# Patient Record
Sex: MALE | Race: WHITE | NOT HISPANIC OR LATINO | Employment: OTHER | URBAN - NONMETROPOLITAN AREA
[De-identification: names, ages, dates, MRNs, and addresses within clinical notes are randomized per-mention and may not be internally consistent; named-entity substitution may affect disease eponyms.]

---

## 2023-11-05 PROBLEM — E66.3 OVERWEIGHT: Status: ACTIVE | Noted: 2023-11-05

## 2023-11-05 PROBLEM — I48.20 CHRONIC ATRIAL FIBRILLATION (MULTI): Status: ACTIVE | Noted: 2023-11-05

## 2023-11-05 PROBLEM — I71.40 AAA (ABDOMINAL AORTIC ANEURYSM) (CMS-HCC): Status: ACTIVE | Noted: 2023-11-05

## 2023-11-05 PROBLEM — Z79.01 ANTICOAGULANT LONG-TERM USE: Status: ACTIVE | Noted: 2023-11-05

## 2023-11-05 PROBLEM — E78.5 HYPERLIPIDEMIA: Status: ACTIVE | Noted: 2023-11-05

## 2023-11-05 RX ORDER — ATORVASTATIN CALCIUM 20 MG/1
TABLET, FILM COATED ORAL
COMMUNITY
Start: 2022-08-30 | End: 2023-11-07 | Stop reason: SDUPTHER

## 2023-11-05 RX ORDER — FINASTERIDE 5 MG/1
1 TABLET, FILM COATED ORAL DAILY
COMMUNITY

## 2023-11-05 RX ORDER — GLUCOSAM/CHONDRO/HERB 149/HYAL 750-100 MG
TABLET ORAL
COMMUNITY

## 2023-11-05 RX ORDER — MULTIVITAMIN
1 TABLET ORAL DAILY
COMMUNITY

## 2023-11-05 RX ORDER — CARVEDILOL 6.25 MG/1
1 TABLET ORAL 2 TIMES DAILY
COMMUNITY
Start: 2022-09-06 | End: 2023-11-07 | Stop reason: SDUPTHER

## 2023-11-05 RX ORDER — FENOFIBRATE 145 MG/1
TABLET, FILM COATED ORAL
COMMUNITY
Start: 2021-12-16 | End: 2023-11-07 | Stop reason: SDUPTHER

## 2023-11-05 RX ORDER — MULTIVIT-MIN/IRON/FOLIC ACID/K 18-600-40
CAPSULE ORAL
COMMUNITY

## 2023-11-07 ENCOUNTER — OFFICE VISIT (OUTPATIENT)
Dept: CARDIOLOGY | Facility: CLINIC | Age: 78
End: 2023-11-07
Payer: MEDICARE

## 2023-11-07 VITALS
HEART RATE: 60 BPM | BODY MASS INDEX: 27.77 KG/M2 | HEIGHT: 72 IN | SYSTOLIC BLOOD PRESSURE: 112 MMHG | WEIGHT: 205 LBS | DIASTOLIC BLOOD PRESSURE: 60 MMHG

## 2023-11-07 DIAGNOSIS — E78.5 HYPERLIPIDEMIA, UNSPECIFIED HYPERLIPIDEMIA TYPE: ICD-10-CM

## 2023-11-07 DIAGNOSIS — I71.40 ABDOMINAL AORTIC ANEURYSM (AAA), UNSPECIFIED PART, UNSPECIFIED WHETHER RUPTURED (CMS-HCC): ICD-10-CM

## 2023-11-07 DIAGNOSIS — I48.20 CHRONIC ATRIAL FIBRILLATION (MULTI): ICD-10-CM

## 2023-11-07 PROCEDURE — 99213 OFFICE O/P EST LOW 20 MIN: CPT | Performed by: INTERNAL MEDICINE

## 2023-11-07 PROCEDURE — 1159F MED LIST DOCD IN RCRD: CPT | Performed by: INTERNAL MEDICINE

## 2023-11-07 RX ORDER — ATORVASTATIN CALCIUM 20 MG/1
TABLET, FILM COATED ORAL
Qty: 90 TABLET | Refills: 3 | Status: SHIPPED | OUTPATIENT
Start: 2023-11-07

## 2023-11-07 RX ORDER — FENOFIBRATE 145 MG/1
TABLET, FILM COATED ORAL
Qty: 90 TABLET | Refills: 3 | Status: SHIPPED | OUTPATIENT
Start: 2023-11-07

## 2023-11-07 RX ORDER — CARVEDILOL 6.25 MG/1
6.25 TABLET ORAL 2 TIMES DAILY
Qty: 180 TABLET | Refills: 3 | Status: SHIPPED | OUTPATIENT
Start: 2023-11-07

## 2023-11-07 NOTE — PROGRESS NOTES
Subjective   Bolivar Oviedo is a 78 y.o. male            HPI     Patient returns in follow-up of problems as noted.  He is doing well.  He tolerates chronic anticoagulant therapy as treatment for his chronic atrial fibrillation he denies any symptoms of palpitation orthopnea PND or dyspnea exertion and he has no easy fatigability.  Overall he is tolerating his atrial fibrillation well.    Risk factor management is briefly discussed and is satisfactory.    He is concerned regarding abdominal aortic aneurysm.  I proposed testing next year because previous assessments of the aneurysm demonstrated a relatively small degree of aneurysmal dilatation.  He is happy to proceed in this fashion.  Review of Systems   All other systems reviewed and are negative.         Visit Vitals  /60 (BP Location: Right arm, Patient Position: Sitting)   Pulse 60   Ht 1.829 m (6')   Wt 93 kg (205 lb)   BMI 27.80 kg/m²   Smoking Status Former   BSA 2.17 m²        Objective   Physical Exam  Constitutional:       Appearance: Normal appearance. He is normal weight.   HENT:      Nose: Nose normal.   Neck:      Vascular: No carotid bruit.   Cardiovascular:      Rate and Rhythm: Normal rate.      Pulses: Normal pulses.      Heart sounds: Normal heart sounds.   Pulmonary:      Effort: Pulmonary effort is normal.   Abdominal:      General: Bowel sounds are normal.      Palpations: Abdomen is soft.   Genitourinary:     Rectum: Normal.   Musculoskeletal:         General: Normal range of motion.      Cervical back: Normal range of motion.      Right lower leg: No edema.      Left lower leg: No edema.   Skin:     General: Skin is warm and dry.   Neurological:      General: No focal deficit present.      Mental Status: He is alert.   Psychiatric:         Mood and Affect: Mood normal.         Behavior: Behavior normal.         Thought Content: Thought content normal.         Judgment: Judgment normal.         Current Medications    Current Outpatient  Medications:     apixaban (Eliquis) 5 mg tablet, Take 1 tablet (5 mg) by mouth 2 times a day., Disp: , Rfl:     ascorbic acid, vitamin C, 500 mg capsule, one tablet daily, Disp: , Rfl:     atorvastatin (Lipitor) 20 mg tablet, TAKE ONE-HALF (1/2) TABLET AT BEDTIME, Disp: , Rfl:     carvedilol (Coreg) 6.25 mg tablet, Take 1 tablet (6.25 mg) by mouth 2 times a day., Disp: , Rfl:     fenofibrate (Tricor) 145 mg tablet, TAKE 1 TABLET Daily with Atorvastatin, Disp: , Rfl:     finasteride (Proscar) 5 mg tablet, Take 1 tablet (5 mg) by mouth once daily., Disp: , Rfl:     multivitamin tablet, Take 1 tablet by mouth once daily., Disp: , Rfl:     omega 3-dha-epa-fish oil (Fish OiL) 1,000 mg (120 mg-180 mg) capsule, one tablet daily, Disp: , Rfl:                      Assessment/Plan   1. Abdominal aortic aneurysm (AAA), unspecified part, unspecified whether ruptured (CMS/HCC)  Previously not significantly enlarged.  Repeat ultrasound will be done in the spring    2. Chronic atrial fibrillation (CMS/HCC)  Chronic stable with good rate control no symptoms.  Tolerating rate control with anticoagulant therapy without complication or complaint.

## 2024-10-23 ENCOUNTER — APPOINTMENT (OUTPATIENT)
Dept: CARDIOLOGY | Facility: CLINIC | Age: 79
End: 2024-10-23
Payer: MEDICARE

## 2024-11-11 ENCOUNTER — APPOINTMENT (OUTPATIENT)
Dept: CARDIOLOGY | Facility: CLINIC | Age: 79
End: 2024-11-11
Payer: MEDICARE

## 2024-11-11 ENCOUNTER — OFFICE VISIT (OUTPATIENT)
Dept: CARDIOLOGY | Facility: CLINIC | Age: 79
End: 2024-11-11
Payer: MEDICARE

## 2024-11-11 VITALS
WEIGHT: 204 LBS | HEIGHT: 72 IN | BODY MASS INDEX: 27.63 KG/M2 | HEART RATE: 58 BPM | SYSTOLIC BLOOD PRESSURE: 105 MMHG | DIASTOLIC BLOOD PRESSURE: 67 MMHG

## 2024-11-11 DIAGNOSIS — I48.20 CHRONIC ATRIAL FIBRILLATION (MULTI): Primary | ICD-10-CM

## 2024-11-11 DIAGNOSIS — Z87.891 FORMER SMOKER: ICD-10-CM

## 2024-11-11 DIAGNOSIS — Z79.01 ANTICOAGULANT LONG-TERM USE: ICD-10-CM

## 2024-11-11 DIAGNOSIS — E78.2 MIXED HYPERLIPIDEMIA: ICD-10-CM

## 2024-11-11 DIAGNOSIS — I71.40 ABDOMINAL AORTIC ANEURYSM (AAA), UNSPECIFIED PART, UNSPECIFIED WHETHER RUPTURED (CMS-HCC): ICD-10-CM

## 2024-11-11 PROBLEM — E66.3 OVERWEIGHT: Status: RESOLVED | Noted: 2023-11-05 | Resolved: 2024-11-11

## 2024-11-11 PROCEDURE — 93000 ELECTROCARDIOGRAM COMPLETE: CPT | Performed by: INTERNAL MEDICINE

## 2024-11-11 PROCEDURE — 99213 OFFICE O/P EST LOW 20 MIN: CPT | Performed by: INTERNAL MEDICINE

## 2024-11-11 PROCEDURE — 1160F RVW MEDS BY RX/DR IN RCRD: CPT | Performed by: INTERNAL MEDICINE

## 2024-11-11 PROCEDURE — G2211 COMPLEX E/M VISIT ADD ON: HCPCS | Performed by: INTERNAL MEDICINE

## 2024-11-11 PROCEDURE — 1159F MED LIST DOCD IN RCRD: CPT | Performed by: INTERNAL MEDICINE

## 2024-11-11 RX ORDER — CARVEDILOL 6.25 MG/1
6.25 TABLET ORAL 2 TIMES DAILY
Qty: 180 TABLET | Refills: 3 | Status: SHIPPED | OUTPATIENT
Start: 2024-11-11

## 2024-11-11 RX ORDER — FENOFIBRATE 145 MG/1
TABLET, FILM COATED ORAL
Qty: 90 TABLET | Refills: 3 | Status: SHIPPED | OUTPATIENT
Start: 2024-11-11

## 2024-11-11 RX ORDER — ATORVASTATIN CALCIUM 20 MG/1
TABLET, FILM COATED ORAL
Qty: 45 TABLET | Refills: 3 | Status: SHIPPED | OUTPATIENT
Start: 2024-11-11

## 2024-11-11 NOTE — LETTER
November 11, 2024     David Ace MD  [address] [facility] [address]    Patient: Bolivar Oviedo   YOB: 1945   Date of Visit: 11/11/2024       Dear Dr. David Ace MD:    Thank you for referring Bolivar Oviedo to me for evaluation. Below are my notes for this consultation.  If you have questions, please do not hesitate to call me. I look forward to following your patient along with you.       Sincerely,     Eva Beth MD      CC: No Recipients  ______________________________________________________________________________________    Subjective   Bolivar Oviedo is a 79 y.o. male       Chief Complaint    Annual Exam          HPI   Patient is here for follow-up continue management for chronic atrial fibrillation.  He is a former patient of Dr. Leroy.  He does not have history of chronic atrial fibrillation treated with heart rate control and long-term anticoagulation.  He is a patient of the [facility].  Since last time he was seen here he denies any cardiac complaint of chest pain, palpitation, lightheadedness, dizziness or syncope.  He remains reasonably active.  His labs from last year noted and reviewed with him.    Assessment    1.  Chronic permanent atrial fibrillation heart rate controlled and on long-term anticoagulation  2.  Anticoagulated well-tolerated  3.  Mixed hyperlipidemia  4.  History of abdominal aortic aneurysm followed by his PCP  5.  Mildly overweight with BMI of 27    Plan    1.  I reviewed with the patient his present medical regimen and I suggested to continue same treatment  2.  I advised him to forward copy of his lab work when it is done to me  3.  I renewed his medication  4.  I will see him back in the office in 9 months and follow-up  Review of Systems   All other systems reviewed and are negative.           Vitals:    11/11/24 0942 11/11/24 1011   BP: 88/60 105/67   BP Location: Left arm Left arm   Patient Position: Sitting Sitting    Pulse: 58    Weight: 92.5 kg (204 lb)    Height: 1.829 m (6')           EKG done in office today    Objective   Physical Exam  Constitutional:       Appearance: Normal appearance.   HENT:      Nose: Nose normal.   Neck:      Vascular: No carotid bruit.   Cardiovascular:      Rate and Rhythm: Normal rate. Rhythm irregularly irregular.      Pulses: Normal pulses.      Heart sounds: Normal heart sounds.   Pulmonary:      Effort: Pulmonary effort is normal.   Abdominal:      General: Bowel sounds are normal.      Palpations: Abdomen is soft.   Musculoskeletal:         General: Normal range of motion.      Cervical back: Normal range of motion.      Right lower leg: No edema.      Left lower leg: No edema.   Skin:     General: Skin is warm and dry.   Neurological:      General: No focal deficit present.      Mental Status: He is alert.   Psychiatric:         Mood and Affect: Mood normal.         Behavior: Behavior normal.         Thought Content: Thought content normal.         Judgment: Judgment normal.         Allergies  Patient has no known allergies.     Current Medications    Current Outpatient Medications:   •  ascorbic acid, vitamin C, 500 mg capsule, one tablet daily, Disp: , Rfl:   •  finasteride (Proscar) 5 mg tablet, Take 1 tablet (5 mg) by mouth once daily. Stopping as of 11/26/2024., Disp: , Rfl:   •  multivitamin tablet, Take 1 tablet by mouth once daily., Disp: , Rfl:   •  omega 3-dha-epa-fish oil (Fish OiL) 1,000 mg (120 mg-180 mg) capsule, one tablet daily, Disp: , Rfl:   •  apixaban (Eliquis) 5 mg tablet, Take 1 tablet (5 mg) by mouth 2 times a day., Disp: 180 tablet, Rfl: 3  •  atorvastatin (Lipitor) 20 mg tablet, TAKE ONE-HALF (1/2) TABLET AT BEDTIME, Disp: 45 tablet, Rfl: 3  •  carvedilol (Coreg) 6.25 mg tablet, Take 1 tablet (6.25 mg) by mouth 2 times a day., Disp: 180 tablet, Rfl: 3  •  fenofibrate (Tricor) 145 mg tablet, TAKE 1 TABLET Daily with Atorvastatin, Disp: 90 tablet, Rfl: 3                      Assessment/Plan   1. Chronic atrial fibrillation (Multi)  Follow Up In Cardiology    Follow Up In Cardiology    ECG 12 Lead    apixaban (Eliquis) 5 mg tablet    carvedilol (Coreg) 6.25 mg tablet      2. Abdominal aortic aneurysm (AAA), unspecified part, unspecified whether ruptured (CMS-HCC)  Follow Up In Cardiology    fenofibrate (Tricor) 145 mg tablet      3. Mixed hyperlipidemia  atorvastatin (Lipitor) 20 mg tablet      4. Anticoagulant long-term use        5. BMI 27.0-27.9,adult        6. Former smoker                 Scribe Attestation  By signing my name below, I, Vero PEÑA LPN  , Scribe   attest that this documentation has been prepared under the direction and in the presence of Eva Beth MD.     Provider Attestation - Scribe documentation    All medical record entries made by the Scribe were at my direction and personally dictated by me. I have reviewed the chart and agree that the record accurately reflects my personal performance of the history, physical exam, discussion and plan.

## 2024-11-11 NOTE — PATIENT INSTRUCTIONS
Please bring all medicines, vitamins, and herbal supplements with you when you come to the office.    Prescriptions will not be filled unless you are compliant with your follow up appointments or have a follow up appointment scheduled as per instruction of your physician. Refills should be requested at the time of your visit.     BMI was above normal measurement. Current weight: 92.5 kg (204 lb)  Weight change since last visit (-) denotes wt loss -1 lbs   Weight loss needed to achieve BMI 25: 20.1 Lbs  Weight loss needed to achieve BMI 30: -16.7 Lbs  Provided instructions on dietary changes.

## 2024-11-11 NOTE — PROGRESS NOTES
Subjective   Bolivar Oviedo is a 79 y.o. male       Chief Complaint    Annual Exam          HPI   Patient is here for follow-up continue management for chronic atrial fibrillation.  He is a former patient of Dr. Leroy.  He does not have history of chronic atrial fibrillation treated with heart rate control and long-term anticoagulation.  He is a patient of the [facility].  Since last time he was seen here he denies any cardiac complaint of chest pain, palpitation, lightheadedness, dizziness or syncope.  He remains reasonably active.  His labs from last year noted and reviewed with him.    Assessment    1.  Chronic permanent atrial fibrillation heart rate controlled and on long-term anticoagulation  2.  Anticoagulated well-tolerated  3.  Mixed hyperlipidemia  4.  History of abdominal aortic aneurysm followed by his PCP  5.  Mildly overweight with BMI of 27    Plan    1.  I reviewed with the patient his present medical regimen and I suggested to continue same treatment  2.  I advised him to forward copy of his lab work when it is done to me  3.  I renewed his medication  4.  I will see him back in the office in 9 months and follow-up  Review of Systems   All other systems reviewed and are negative.           Vitals:    11/11/24 0942 11/11/24 1011   BP: 88/60 105/67   BP Location: Left arm Left arm   Patient Position: Sitting Sitting   Pulse: 58    Weight: 92.5 kg (204 lb)    Height: 1.829 m (6')           EKG done in office today    Objective   Physical Exam  Constitutional:       Appearance: Normal appearance.   HENT:      Nose: Nose normal.   Neck:      Vascular: No carotid bruit.   Cardiovascular:      Rate and Rhythm: Normal rate. Rhythm irregularly irregular.      Pulses: Normal pulses.      Heart sounds: Normal heart sounds.   Pulmonary:      Effort: Pulmonary effort is normal.   Abdominal:      General: Bowel sounds are normal.      Palpations: Abdomen is soft.   Musculoskeletal:         General:  Normal range of motion.      Cervical back: Normal range of motion.      Right lower leg: No edema.      Left lower leg: No edema.   Skin:     General: Skin is warm and dry.   Neurological:      General: No focal deficit present.      Mental Status: He is alert.   Psychiatric:         Mood and Affect: Mood normal.         Behavior: Behavior normal.         Thought Content: Thought content normal.         Judgment: Judgment normal.         Allergies  Patient has no known allergies.     Current Medications    Current Outpatient Medications:     ascorbic acid, vitamin C, 500 mg capsule, one tablet daily, Disp: , Rfl:     finasteride (Proscar) 5 mg tablet, Take 1 tablet (5 mg) by mouth once daily. Stopping as of 11/26/2024., Disp: , Rfl:     multivitamin tablet, Take 1 tablet by mouth once daily., Disp: , Rfl:     omega 3-dha-epa-fish oil (Fish OiL) 1,000 mg (120 mg-180 mg) capsule, one tablet daily, Disp: , Rfl:     apixaban (Eliquis) 5 mg tablet, Take 1 tablet (5 mg) by mouth 2 times a day., Disp: 180 tablet, Rfl: 3    atorvastatin (Lipitor) 20 mg tablet, TAKE ONE-HALF (1/2) TABLET AT BEDTIME, Disp: 45 tablet, Rfl: 3    carvedilol (Coreg) 6.25 mg tablet, Take 1 tablet (6.25 mg) by mouth 2 times a day., Disp: 180 tablet, Rfl: 3    fenofibrate (Tricor) 145 mg tablet, TAKE 1 TABLET Daily with Atorvastatin, Disp: 90 tablet, Rfl: 3                     Assessment/Plan   1. Chronic atrial fibrillation (Multi)  Follow Up In Cardiology    Follow Up In Cardiology    ECG 12 Lead    apixaban (Eliquis) 5 mg tablet    carvedilol (Coreg) 6.25 mg tablet      2. Abdominal aortic aneurysm (AAA), unspecified part, unspecified whether ruptured (CMS-HCC)  Follow Up In Cardiology    fenofibrate (Tricor) 145 mg tablet      3. Mixed hyperlipidemia  atorvastatin (Lipitor) 20 mg tablet      4. Anticoagulant long-term use        5. BMI 27.0-27.9,adult        6. Former smoker                 Scribe Attestation  By signing my name below, I,  Vero PEÑA LPN  , Scribe   attest that this documentation has been prepared under the direction and in the presence of Eva Beth MD.     Provider Attestation - Scribe documentation    All medical record entries made by the Scribe were at my direction and personally dictated by me. I have reviewed the chart and agree that the record accurately reflects my personal performance of the history, physical exam, discussion and plan.

## 2024-11-12 ENCOUNTER — APPOINTMENT (OUTPATIENT)
Dept: CARDIOLOGY | Facility: CLINIC | Age: 79
End: 2024-11-12
Payer: MEDICARE

## 2024-11-19 ENCOUNTER — APPOINTMENT (OUTPATIENT)
Dept: CARDIOLOGY | Facility: CLINIC | Age: 79
End: 2024-11-19
Payer: MEDICARE

## 2025-07-18 ENCOUNTER — APPOINTMENT (OUTPATIENT)
Dept: CARDIOLOGY | Facility: CLINIC | Age: 80
End: 2025-07-18
Payer: MEDICARE

## 2025-07-18 VITALS
HEIGHT: 71 IN | SYSTOLIC BLOOD PRESSURE: 124 MMHG | HEART RATE: 64 BPM | DIASTOLIC BLOOD PRESSURE: 74 MMHG | WEIGHT: 198 LBS | BODY MASS INDEX: 27.72 KG/M2

## 2025-07-18 DIAGNOSIS — I48.20 CHRONIC ATRIAL FIBRILLATION (MULTI): Primary | ICD-10-CM

## 2025-07-18 DIAGNOSIS — E78.2 MIXED HYPERLIPIDEMIA: ICD-10-CM

## 2025-07-18 DIAGNOSIS — I71.40 ABDOMINAL AORTIC ANEURYSM (AAA) WITHOUT RUPTURE, UNSPECIFIED PART: ICD-10-CM

## 2025-07-18 DIAGNOSIS — Z87.891 FORMER SMOKER: ICD-10-CM

## 2025-07-18 DIAGNOSIS — Z79.01 ANTICOAGULANT LONG-TERM USE: ICD-10-CM

## 2025-07-18 PROCEDURE — 1160F RVW MEDS BY RX/DR IN RCRD: CPT | Performed by: INTERNAL MEDICINE

## 2025-07-18 PROCEDURE — 99213 OFFICE O/P EST LOW 20 MIN: CPT | Performed by: INTERNAL MEDICINE

## 2025-07-18 PROCEDURE — 1159F MED LIST DOCD IN RCRD: CPT | Performed by: INTERNAL MEDICINE

## 2025-07-18 NOTE — PATIENT INSTRUCTIONS
Please bring all medicines, vitamins, and herbal supplements with you when you come to the office.    Prescriptions will not be filled unless you are compliant with your follow up appointments or have a follow up appointment scheduled as per instruction of your physician. Refills should be requested at the time of your visit.     BMI was above normal measurement. Current weight: 89.8 kg (198 lb)  Weight change since last visit (-) denotes wt loss -6 lbs   Weight loss needed to achieve BMI 25: 19.1 Lbs  Weight loss needed to achieve BMI 30: -16.6 Lbs  Provided instructions on dietary changes  Provided instructions on exercise.

## 2025-07-18 NOTE — PROGRESS NOTES
"Chief Complaint   Patient presents with    Follow-up     6 month Follow up for Atrial Fibrillation         Subjective   Bolivar Oviedo is a 80 y.o. male     HPI   Patient here for permanent atrial fibrillation, long-term anticoagulation and hyperlipidemia.  Since last time I saw him he denies any cardiac complaint of chest pain, palpitation, lightheadedness, dizziness or syncope he remains reasonably active.  [facility] reviewed with him.    Assessment     1.  Chronic permanent atrial fibrillation heart rate controlled and on long-term anticoagulation well-tolerated without any side effect  2.  Anticoagulated well-tolerated with Eliquis  3.  Mixed hyperlipidemia on atorvastatin recent lab noted and reviewed with him  4.  History of abdominal aortic aneurysm followed by his PCP  5.  Mildly overweight with BMI of 27     Plan     1.  I reviewed with the patient his present medical regimen and I suggested to continue same treatment  2.  I reviewed his recent lab  3.  I renewed his medication  4.  I will see him back in the office in 9 months with an EKG  Review of Systems   All other systems reviewed and are negative.           Vitals:    07/18/25 1016   BP: 124/74   BP Location: Left arm   Patient Position: Sitting   Pulse: 64   Weight: 89.8 kg (198 lb)   Height: 1.803 m (5' 11\")        Objective   Physical Exam  Constitutional:       Appearance: Normal appearance.   HENT:      Nose: Nose normal.   Neck:      Vascular: No carotid bruit.     Cardiovascular:      Rate and Rhythm: Normal rate. Rhythm irregularly irregular.      Pulses: Normal pulses.      Heart sounds: Normal heart sounds.   Pulmonary:      Effort: Pulmonary effort is normal.   Abdominal:      General: Bowel sounds are normal.      Palpations: Abdomen is soft.     Musculoskeletal:         General: Normal range of motion.      Cervical back: Normal range of motion.      Right lower leg: No edema.      Left lower leg: No edema. "     Skin:     General: Skin is warm and dry.     Neurological:      General: No focal deficit present.      Mental Status: He is alert.     Psychiatric:         Mood and Affect: Mood normal.         Behavior: Behavior normal.         Thought Content: Thought content normal.         Judgment: Judgment normal.         Allergies  Patient has no known allergies.     Current Medications  Current Outpatient Medications   Medication Instructions    apixaban (ELIQUIS) 5 mg, oral, 2 times daily    ascorbic acid, vitamin C, 500 mg capsule one tablet daily    atorvastatin (Lipitor) 20 mg tablet TAKE ONE-HALF (1/2) TABLET AT BEDTIME    carvedilol (COREG) 6.25 mg, oral, 2 times daily    fenofibrate (Tricor) 145 mg tablet TAKE 1 TABLET Daily with Atorvastatin    finasteride (Proscar) 5 mg tablet 1 tablet, Daily    multivitamin tablet 1 tablet, Daily    omega 3-dha-epa-fish oil (Fish OiL) 1,000 mg (120 mg-180 mg) capsule one tablet daily                        Assessment/Plan   1. Chronic atrial fibrillation (Multi)  Follow Up In Cardiology    Follow Up In Cardiology      2. Mixed hyperlipidemia        3. Anticoagulant long-term use        4. BMI 27.0-27.9,adult        5. Former smoker        6. Abdominal aortic aneurysm (AAA) without rupture, unspecified part                 Scribe Attestation  By signing my name below, Deanna VALENTINO LPN, Scribe   attest that this documentation has been prepared under the direction and in the presence of Eva Beth MD.     Provider Attestation - Scribe documentation    All medical record entries made by the Scribe were at my direction and personally dictated by me. I have reviewed the chart and agree that the record accurately reflects my personal performance of the history, physical exam, discussion and plan.

## 2025-07-18 NOTE — LETTER
"July 18, 2025     David Ace MD  [address] [facility] [address]    Patient: Bolivar Oviedo   YOB: 1945   Date of Visit: 7/18/2025       Dear Dr. David Ace MD:    Thank you for referring Bolivar Oviedo to me for evaluation. Below are my notes for this consultation.  If you have questions, please do not hesitate to call me. I look forward to following your patient along with you.       Sincerely,     Eva Bteh MD      CC: No Recipients  ______________________________________________________________________________________    Chief Complaint   Patient presents with   • Follow-up     6 month Follow up for Atrial Fibrillation         Subjective   Bolivar Oviedo is a 80 y.o. male     HPI   Patient here for permanent atrial fibrillation, long-term anticoagulation and hyperlipidemia.  Since last time I saw him he denies any cardiac complaint of chest pain, palpitation, lightheadedness, dizziness or syncope he remains reasonably active.  [facility] reviewed with him.    Assessment     1.  Chronic permanent atrial fibrillation heart rate controlled and on long-term anticoagulation well-tolerated without any side effect  2.  Anticoagulated well-tolerated with Eliquis  3.  Mixed hyperlipidemia on atorvastatin recent lab noted and reviewed with him  4.  History of abdominal aortic aneurysm followed by his PCP  5.  Mildly overweight with BMI of 27     Plan     1.  I reviewed with the patient his present medical regimen and I suggested to continue same treatment  2.  I reviewed his recent lab  3.  I renewed his medication  4.  I will see him back in the office in 9 months with an EKG  Review of Systems   All other systems reviewed and are negative.           Vitals:    07/18/25 1016   BP: 124/74   BP Location: Left arm   Patient Position: Sitting   Pulse: 64   Weight: 89.8 kg (198 lb)   Height: 1.803 m (5' 11\")        Objective   Physical Exam  Constitutional:  "      Appearance: Normal appearance.   HENT:      Nose: Nose normal.   Neck:      Vascular: No carotid bruit.     Cardiovascular:      Rate and Rhythm: Normal rate. Rhythm irregularly irregular.      Pulses: Normal pulses.      Heart sounds: Normal heart sounds.   Pulmonary:      Effort: Pulmonary effort is normal.   Abdominal:      General: Bowel sounds are normal.      Palpations: Abdomen is soft.     Musculoskeletal:         General: Normal range of motion.      Cervical back: Normal range of motion.      Right lower leg: No edema.      Left lower leg: No edema.     Skin:     General: Skin is warm and dry.     Neurological:      General: No focal deficit present.      Mental Status: He is alert.     Psychiatric:         Mood and Affect: Mood normal.         Behavior: Behavior normal.         Thought Content: Thought content normal.         Judgment: Judgment normal.         Allergies  Patient has no known allergies.     Current Medications  Current Outpatient Medications   Medication Instructions   • apixaban (ELIQUIS) 5 mg, oral, 2 times daily   • ascorbic acid, vitamin C, 500 mg capsule one tablet daily   • atorvastatin (Lipitor) 20 mg tablet TAKE ONE-HALF (1/2) TABLET AT BEDTIME   • carvedilol (COREG) 6.25 mg, oral, 2 times daily   • fenofibrate (Tricor) 145 mg tablet TAKE 1 TABLET Daily with Atorvastatin   • finasteride (Proscar) 5 mg tablet 1 tablet, Daily   • multivitamin tablet 1 tablet, Daily   • omega 3-dha-epa-fish oil (Fish OiL) 1,000 mg (120 mg-180 mg) capsule one tablet daily                        Assessment/Plan   1. Chronic atrial fibrillation (Multi)  Follow Up In Cardiology    Follow Up In Cardiology      2. Mixed hyperlipidemia        3. Anticoagulant long-term use        4. BMI 27.0-27.9,adult        5. Former smoker        6. Abdominal aortic aneurysm (AAA) without rupture, unspecified part                 Scribe Attestation  By signing my name below, Deanna VALENTINO LPN   , Scribe   attest that  this documentation has been prepared under the direction and in the presence of Eva Beth MD.     Provider Attestation - Scribe documentation    All medical record entries made by the Scribe were at my direction and personally dictated by me. I have reviewed the chart and agree that the record accurately reflects my personal performance of the history, physical exam, discussion and plan.

## 2025-08-06 ENCOUNTER — TELEPHONE (OUTPATIENT)
Dept: CARDIOLOGY | Facility: CLINIC | Age: 80
End: 2025-08-06
Payer: MEDICARE

## 2025-08-06 NOTE — TELEPHONE ENCOUNTER
"Patient phoned that this morning when he woke up and sat up he was dizzy and lost his vision. States his legs \"wouldn't work\" so he crawled to the bathroom. He also became nauseated. These symptoms let up after \"awhile\". States has has happened 2 more times this morning. He did state this did happen once one month ago. Denies any other symptoms. Advised patient to go to the ER for evaluation. States he called his son already and is waiting on a return call to come sit wife due to she has advanced dementia and patient is here caretaker. States [place] [facility] advise.    To Dr. Eva Beth MD   "

## 2025-08-06 NOTE — TELEPHONE ENCOUNTER
Attempted to phone patient detailed message left. To [facility] to follow up to see if patient did go to ER.

## 2026-04-10 ENCOUNTER — APPOINTMENT (OUTPATIENT)
Dept: CARDIOLOGY | Facility: CLINIC | Age: 81
End: 2026-04-10
Payer: MEDICARE